# Patient Record
Sex: FEMALE | Race: OTHER | NOT HISPANIC OR LATINO | URBAN - METROPOLITAN AREA
[De-identification: names, ages, dates, MRNs, and addresses within clinical notes are randomized per-mention and may not be internally consistent; named-entity substitution may affect disease eponyms.]

---

## 2024-06-17 ENCOUNTER — OFFICE VISIT (OUTPATIENT)
Dept: URBAN - METROPOLITAN AREA CLINIC 109 | Facility: CLINIC | Age: 34
End: 2024-06-17

## 2024-07-03 ENCOUNTER — OFFICE VISIT (OUTPATIENT)
Dept: URBAN - METROPOLITAN AREA CLINIC 70 | Facility: CLINIC | Age: 34
End: 2024-07-03
Payer: COMMERCIAL

## 2024-07-03 ENCOUNTER — DASHBOARD ENCOUNTERS (OUTPATIENT)
Age: 34
End: 2024-07-03

## 2024-07-03 ENCOUNTER — LAB OUTSIDE AN ENCOUNTER (OUTPATIENT)
Dept: URBAN - METROPOLITAN AREA CLINIC 70 | Facility: CLINIC | Age: 34
End: 2024-07-03

## 2024-07-03 VITALS
HEIGHT: 64 IN | HEART RATE: 102 BPM | BODY MASS INDEX: 34.38 KG/M2 | SYSTOLIC BLOOD PRESSURE: 127 MMHG | TEMPERATURE: 97.5 F | WEIGHT: 201.4 LBS | DIASTOLIC BLOOD PRESSURE: 84 MMHG

## 2024-07-03 DIAGNOSIS — R14.0 ABDOMINAL BLOATING: ICD-10-CM

## 2024-07-03 DIAGNOSIS — K59.09 CHRONIC CONSTIPATION: ICD-10-CM

## 2024-07-03 DIAGNOSIS — R10.32 LLQ DISCOMFORT: ICD-10-CM

## 2024-07-03 DIAGNOSIS — K80.20 CALCULUS OF GALLBLADDER WITHOUT CHOLECYSTITIS WITHOUT OBSTRUCTION: ICD-10-CM

## 2024-07-03 PROBLEM — 197321007: Status: ACTIVE | Noted: 2024-07-03

## 2024-07-03 PROBLEM — 236069009: Status: ACTIVE | Noted: 2024-07-03

## 2024-07-03 PROBLEM — 116289008: Status: ACTIVE | Noted: 2024-07-03

## 2024-07-03 PROBLEM — 301716002: Status: ACTIVE | Noted: 2024-07-03

## 2024-07-03 PROBLEM — 70342003: Status: ACTIVE | Noted: 2024-07-03

## 2024-07-03 PROBLEM — 88111009: Status: ACTIVE | Noted: 2024-07-03

## 2024-07-03 PROCEDURE — 99204 OFFICE O/P NEW MOD 45 MIN: CPT | Performed by: INTERNAL MEDICINE

## 2024-07-03 RX ORDER — LACTULOSE 10 G/15ML
30 ML SOLUTION ORAL TWICE A DAY
Qty: 1800 ML | Refills: 0 | OUTPATIENT
Start: 2024-07-03 | End: 2024-08-02

## 2024-07-03 NOTE — HPI-TODAY'S VISIT:
33-year-old female here with c/o constipation and LLQ discomfort. Reports she has had constipation for a while but it has worsen in 2024. C/o LLQ discomfort (reports that it feels like bloating mostly), non-raditing, no aggravating and or relieving factors identified. Denies having any vomiting, melena, hematochezia, weight loss, lack of appetite. Medications and social history reviewed. Family history reviewed  Of note, patient was seen in the emergency department on 4/14/2024 with complaint of right upper quadrant abdominal pain with associated nausea.  She underwent an ultrasound right upper quadrant that showed cholelithiasis and gallbladder sludge without cholecystitis and hepatic steatosis.  Blood work in the emergency department showed elevated platelets and RDW, CBC was otherwise normal, BMP showed elevated glucose, otherwise normal, LFTs were normal patient was diagnosed with cholelithiasis and biliary colic and discharged home with surgery follow-up.

## 2024-07-18 ENCOUNTER — CLAIMS CREATED FROM THE CLAIM WINDOW (OUTPATIENT)
Dept: URBAN - METROPOLITAN AREA CLINIC 4 | Facility: CLINIC | Age: 34
End: 2024-07-18
Payer: COMMERCIAL

## 2024-07-18 ENCOUNTER — CLAIMS CREATED FROM THE CLAIM WINDOW (OUTPATIENT)
Dept: URBAN - METROPOLITAN AREA SURGERY CENTER 24 | Facility: SURGERY CENTER | Age: 34
End: 2024-07-18
Payer: COMMERCIAL

## 2024-07-18 DIAGNOSIS — K63.89 OTHER SPECIFIED DISEASES OF INTESTINE: ICD-10-CM

## 2024-07-18 DIAGNOSIS — R19.4 CHANGE IN BOWEL HABITS: ICD-10-CM

## 2024-07-18 DIAGNOSIS — R19.4 ALTERATION IN BOWEL ELIMINATION: ICD-10-CM

## 2024-07-18 DIAGNOSIS — K64.8 OTHER HEMORRHOIDS: ICD-10-CM

## 2024-07-18 PROCEDURE — 88313 SPECIAL STAINS GROUP 2: CPT | Performed by: PATHOLOGY

## 2024-07-18 PROCEDURE — 45380 COLONOSCOPY AND BIOPSY: CPT | Performed by: INTERNAL MEDICINE

## 2024-07-18 PROCEDURE — 88342 IMHCHEM/IMCYTCHM 1ST ANTB: CPT | Performed by: PATHOLOGY

## 2024-07-18 PROCEDURE — 00811 ANES LWR INTST NDSC NOS: CPT | Performed by: ANESTHESIOLOGY

## 2024-07-18 PROCEDURE — 88305 TISSUE EXAM BY PATHOLOGIST: CPT | Performed by: PATHOLOGY

## 2024-07-18 RX ORDER — LACTULOSE 10 G/15ML
30 ML SOLUTION ORAL TWICE A DAY
Qty: 1800 ML | Refills: 0 | Status: ACTIVE | COMMUNITY
Start: 2024-07-03 | End: 2024-08-02

## 2024-07-29 ENCOUNTER — OFFICE VISIT (OUTPATIENT)
Dept: URBAN - METROPOLITAN AREA CLINIC 109 | Facility: CLINIC | Age: 34
End: 2024-07-29

## 2024-08-14 ENCOUNTER — LAB OUTSIDE AN ENCOUNTER (OUTPATIENT)
Dept: URBAN - METROPOLITAN AREA CLINIC 70 | Facility: CLINIC | Age: 34
End: 2024-08-14

## 2024-08-14 ENCOUNTER — OFFICE VISIT (OUTPATIENT)
Dept: URBAN - METROPOLITAN AREA CLINIC 70 | Facility: CLINIC | Age: 34
End: 2024-08-14
Payer: COMMERCIAL

## 2024-08-14 VITALS
DIASTOLIC BLOOD PRESSURE: 80 MMHG | BODY MASS INDEX: 35.03 KG/M2 | TEMPERATURE: 97.7 F | WEIGHT: 205.2 LBS | HEART RATE: 99 BPM | HEIGHT: 64 IN | SYSTOLIC BLOOD PRESSURE: 114 MMHG

## 2024-08-14 DIAGNOSIS — K80.20 CALCULUS OF GALLBLADDER WITHOUT CHOLECYSTITIS WITHOUT OBSTRUCTION: ICD-10-CM

## 2024-08-14 DIAGNOSIS — R10.30 LOWER ABDOMINAL PAIN: ICD-10-CM

## 2024-08-14 DIAGNOSIS — K59.09 CHRONIC CONSTIPATION: ICD-10-CM

## 2024-08-14 PROBLEM — 54586004: Status: ACTIVE | Noted: 2024-08-14

## 2024-08-14 PROCEDURE — 99214 OFFICE O/P EST MOD 30 MIN: CPT | Performed by: INTERNAL MEDICINE

## 2024-08-14 NOTE — HPI-TODAY'S VISIT:
33-year-old female here for follow-up.  Previously seen with complaint of chronic constipation, left lower quadrant discomfort and bloating.  At today's visit, patient reports that she continues to experience lower abdominal discomfort.  Patient is unable to describe the characteristics of her pain/discomfort, she states that it is very uncomfortable for her. Still continues to experience constipation and bloating, she is not compliant with her lactulose.  ROS otherwise unremarkable. colonoscopy 7/18/2024 showed normal ileum, normal colon, external and internal hemorrhoids.  Terminal ileum and random colon biopsies showed no significant abnormality.  Of note, patient was seen in the emergency department on 4/14/2024 with complaint of right upper quadrant abdominal pain with associated nausea.  She underwent an ultrasound right upper quadrant that showed cholelithiasis and gallbladder sludge without cholecystitis and hepatic steatosis. Patient was referred to surgery for management of cholelithiasis and biliary colic. She follows with Dr Kalin Gusman.

## 2024-12-17 ENCOUNTER — OFFICE VISIT (OUTPATIENT)
Dept: URBAN - METROPOLITAN AREA CLINIC 70 | Facility: CLINIC | Age: 34
End: 2024-12-17